# Patient Record
Sex: FEMALE | Race: WHITE | NOT HISPANIC OR LATINO | Employment: FULL TIME | URBAN - METROPOLITAN AREA
[De-identification: names, ages, dates, MRNs, and addresses within clinical notes are randomized per-mention and may not be internally consistent; named-entity substitution may affect disease eponyms.]

---

## 2017-06-27 ENCOUNTER — GENERIC CONVERSION - ENCOUNTER (OUTPATIENT)
Dept: OTHER | Facility: OTHER | Age: 52
End: 2017-06-27

## 2017-12-27 ENCOUNTER — ALLSCRIPTS OFFICE VISIT (OUTPATIENT)
Dept: OTHER | Facility: OTHER | Age: 52
End: 2017-12-27

## 2017-12-28 NOTE — PROGRESS NOTES
Assessment   1  Encounter for gynecological examination () (Z01 419)    Discussion/Summary      Patient informed of a normal GYN exam  A pap smear was not performed due to negative HPV  She will make arrangements for a mammogram in  of this coming year  The patient has the current Goals: To continue a healthy lifestyle  The patent has the current Barriers: No barriers  Patient is able to Self-Care  Chief Complaint   Annual visit      History of Present Illness   HPI: Patient is a 50year old white female,  with 3 prior vaginal deliveries and one C/S  Her method of birth control includes a vasectomy  Her menses are regular and predictable  She is experiencing mood changes and sleep disturbances  Denies hot flashes and vaginal dryness  She denies problems with intimacy  She had normal mammogram in   She denies any new major medical or family illnesses  She is currently seeing a therapist for some anxiety she is having due to issues with her son  GYN , Adult Female [facility] Awkward: The patient is being seen for a gynecology evaluation  The last health maintenance visit was 1 year(s) ago  General Health: The patient's health since the last visit is described as good  She has regular dental visits  -- She denies vision problems  -- She denies hearing loss  Immunizations status: up to date  Lifestyle:  She consumes a diverse and healthy diet  -- She does not have any weight concerns  -- She exercises regularly  -- She does not use tobacco -- She consumes alcohol  She reports occasional alcohol use  She typically drinks wine  Reproductive health: the patient is perimenopausal--   she uses contraception  For contraception, she has a partner with a vasectomy  -- she is sexually active  -- pregnancy history: G 4P 4,-- 4  Screening: cancer screening reviewed and current        Review of Systems        Constitutional: No fever, no chills, feels well, no tiredness, no recent weight gain or loss       ENT: no ear ache, no loss of hearing, no nosebleeds or nasal discharge, no sore throat or hoarseness  Cardiovascular: no complaints of slow or fast heart rate, no chest pain, no palpitations, no leg claudication or lower extremity edema  Respiratory: no complaints of shortness of breath, no wheezing, no dyspnea on exertion, no orthopnea or PND  Breasts: no complaints of breast pain, breast lump or nipple discharge  Gastrointestinal: no complaints of abdominal pain, no constipation, no nausea or diarrhea, no vomiting, no bloody stools  Genitourinary: no complaints of dysuria, no incontinence, no pelvic pain, no dysmenorrhea, no vaginal discharge or abnormal vaginal bleeding  Musculoskeletal: no complaints of arthralgia, no myalgia, no joint swelling or stiffness, no limb pain or swelling  Integumentary: no complaints of skin rash or lesion, no itching or dry skin, no skin wounds  Neurological: no complaints of headache, no confusion, no numbness or tingling, no dizziness or fainting  Over the past 2 weeks, how often have you been bothered by the following problems? 1 ) Little interest or pleasure in doing things? Not at all       2 ) Feeling down, depressed or hopeless? Not at all       3 ) Trouble falling asleep or sleeping too much? Not at all       4 ) Feeling tired or having little energy? Not at all       5 ) Poor appetite or overeating? Not at all       6 ) Feeling bad about yourself, or that you are a failure, or have let yourself or your family down? Not at all       7 ) Trouble concentrating on things, such as reading a newspaper or watching television? Not at all       8 ) Moving or speaking so slowly that other people could have noticed, or the opposite, moving or speaking faster than usual? Not at all  How difficult have these problems made it for you to do your work, take care of things at home, or get along with people? Not at all  Score       OB History   Pregnancy History (Brief):      Prior pregnancies: : 4  Para: 4 (full-term)-- and-- 4 (living)      Delivery type: 3 vaginal,-- 1  section       Active Problems   1  Encounter for gynecological examination (V72 31) (Z01 419)   2  Encounter for screening mammogram for breast cancer (V76 12) (Z12 31)    Surgical History    · History of Appendectomy   · History of  Section     The surgical history was reviewed and updated today  Family History   Mother    · Family history of lung cancer (V16 1) (Z80 1)  Father    · Family history of MVA (motor vehicle accident)  Grandfather    · Family history of heart attack (V17 3) (Z82 49)    Social History    · Never a smoker  The social history was reviewed and updated today  Current Meds    1  No Reported Medications Recorded    Allergies   1  No Known Drug Allergies    Vitals    Recorded: [id number] 83:61AP   Systolic 747   Diastolic 90   Height 5 ft 7 in   Weight 155 lb 6 08 oz   BMI Calculated 24 34   BSA Calculated 1 82   LMP 17-Dec-2017     Physical Exam        Constitutional      General appearance: No acute distress, well appearing and well nourished  Neck      Neck: Normal, supple, trachea midline, no masses  Thyroid: Normal, no thyromegaly  Pulmonary      Respiratory effort: No increased work of breathing or signs of respiratory distress  Auscultation of lungs: Clear to auscultation  Cardiovascular      Auscultation of heart: Normal rate and rhythm, normal S1 and S2, no murmurs  Peripheral vascular exam: Normal pulses Throughout  Genitourinary      External genitalia: Normal and no lesions appreciated  Vagina: Normal, no lesions or dryness appreciated  Urethra: Normal        Urethral meatus: Normal        Bladder: Normal, soft, non-tender and no prolapse or masses appreciated  Cervix: Normal, no palpable masses         Uterus: Normal, non-tender, not enlarged, and no palpable masses  Adnexa/parametria: Normal, non-tender and no fullness or masses appreciated  Chest      Breasts: Normal and no dimpling or skin changes noted  Abdomen      Abdomen: Normal, non-tender, and no organomegaly noted  Liver and spleen: No hepatomegaly or splenomegaly  Examination for hernias: No hernias appreciated  Lymphatic      Palpation of lymph nodes in neck, axillae, groin and/or other locations: No lymphadenopathy or masses noted  Skin      Skin and subcutaneous tissue: Normal skin turgor and no rashes         Palpation of skin and subcutaneous tissue: Normal        Psychiatric      Orientation to person, place, and time: Normal        Mood and affect: Normal        Signatures    Electronically signed by : Bev Bear; Dec 27 2017  3:24PM EST                       (Author)

## 2018-01-22 VITALS
BODY MASS INDEX: 24.39 KG/M2 | HEIGHT: 67 IN | DIASTOLIC BLOOD PRESSURE: 90 MMHG | WEIGHT: 155.38 LBS | SYSTOLIC BLOOD PRESSURE: 134 MMHG

## 2019-07-01 ENCOUNTER — ANNUAL EXAM (OUTPATIENT)
Dept: OBGYN CLINIC | Facility: CLINIC | Age: 54
End: 2019-07-01
Payer: COMMERCIAL

## 2019-07-01 VITALS
BODY MASS INDEX: 24.64 KG/M2 | DIASTOLIC BLOOD PRESSURE: 84 MMHG | WEIGHT: 157 LBS | SYSTOLIC BLOOD PRESSURE: 132 MMHG | HEIGHT: 67 IN

## 2019-07-01 DIAGNOSIS — Z01.419 WOMEN'S ANNUAL ROUTINE GYNECOLOGICAL EXAMINATION: Primary | ICD-10-CM

## 2019-07-01 PROCEDURE — G0145 SCR C/V CYTO,THINLAYER,RESCR: HCPCS | Performed by: OBSTETRICS & GYNECOLOGY

## 2019-07-01 PROCEDURE — 87624 HPV HI-RISK TYP POOLED RSLT: CPT | Performed by: OBSTETRICS & GYNECOLOGY

## 2019-07-01 PROCEDURE — 99396 PREV VISIT EST AGE 40-64: CPT | Performed by: OBSTETRICS & GYNECOLOGY

## 2019-07-01 NOTE — PATIENT INSTRUCTIONS
The patient was informed of a stable britt menopausal gyn examination  A Pap smear was performed  She also has PMS we talked about treatment options including SSRI  Prior to that we have asked her to exercise more, take vitamin B6 daily, and diminish her caffeine in her diet  Colonoscopies are up-to-date  Return to office in 1 year

## 2019-07-01 NOTE — PROGRESS NOTES
HPI    this is a 82-year-old white female, she is a  4 para 4 with 3 vaginal deliveries and 1  section  Her current method of contraception includes vasectomy  Her menstrual cycles are mostly regular  She has noted she has now skipping 2 months over the last year  She also admits to occasional  vaginal dryness  She denies any problem with intimacy  She denies any major  GI complaint  She is not happy with her weight  She does admit to some PMS symptomatology  She has a dentist on a regular basis  She denies depression or anxiety but does admit to some PMS  She denies any new major family illnesses at the present time  She has had a colonoscopy and mammogram     REVIEW OF SYSTEMS   patient admits to some PMS symptomatology  Including mood swings as she approaches menstrual cycles which is relieved after onset of menses    PAST MEDICAL HISTORY  now perimenopause, premenstrual syndrome    PAST SURGICAL HISTORY   appendectomy,  section    FAMILY HISTORY   positive for lung cancer, heart attack,    SOCIAL HISTORY   negative for tobacco positive for social alcohol    PHYSICAL EXAM     this is a well-developed well-nourished white female acute distress  Her HEENT is was within normal limits  Her neck is supple no masses  Her BMI is 24 6  Cardiac exam shows a regular rhythm and rate normal S1-S2  Her lungs are clear auscultation bilaterally  Her abdomen is softer no masses is evidence of  section scar and an appendectomy scar both the will feel  There is no organomegaly  Positive bowel sounds  Pelvic exam the external genitalia normal limits the vagina is clean the cervix is parous but closed uterus is anterior normal size is no cervical motion tenderness  The adnexa clear bilaterally  A Pap smear was performed  There was evidence of recent menses  IMPRESSION  the patient was informed of a stable britt menopausal gyn examination  We had a discussion about PMS    I have asked her to diminish her caffeine, increased vitamin B6 and to exercise more  She may consider an SSRI of symptoms worsen she will keep me informed  She will make arrangements for new mammogram   Her colonoscopies are up-to-date

## 2019-07-02 LAB
HPV HR 12 DNA CVX QL NAA+PROBE: NEGATIVE
HPV16 DNA CVX QL NAA+PROBE: NEGATIVE
HPV18 DNA CVX QL NAA+PROBE: NEGATIVE

## 2019-07-08 LAB
LAB AP GYN PRIMARY INTERPRETATION: NORMAL
LAB AP LMP: NORMAL
Lab: NORMAL

## 2021-03-23 ENCOUNTER — TELEPHONE (OUTPATIENT)
Dept: OBGYN CLINIC | Facility: CLINIC | Age: 56
End: 2021-03-23

## 2021-03-23 NOTE — TELEPHONE ENCOUNTER
Pt having ext vag itching since last week - has used Vagisil  No noticeable discharge  Will try Monistat 3 tx & recall if unimproved  Has yearly appt scheduled for 6/15/2021

## 2021-03-26 DIAGNOSIS — Z23 ENCOUNTER FOR IMMUNIZATION: ICD-10-CM

## 2021-06-15 ENCOUNTER — ANNUAL EXAM (OUTPATIENT)
Dept: OBGYN CLINIC | Facility: CLINIC | Age: 56
End: 2021-06-15
Payer: COMMERCIAL

## 2021-06-15 VITALS
SYSTOLIC BLOOD PRESSURE: 140 MMHG | WEIGHT: 159.6 LBS | HEIGHT: 67 IN | DIASTOLIC BLOOD PRESSURE: 90 MMHG | BODY MASS INDEX: 25.05 KG/M2

## 2021-06-15 DIAGNOSIS — Z12.31 ENCOUNTER FOR SCREENING MAMMOGRAM FOR MALIGNANT NEOPLASM OF BREAST: ICD-10-CM

## 2021-06-15 DIAGNOSIS — Z01.419 WOMEN'S ANNUAL ROUTINE GYNECOLOGICAL EXAMINATION: Primary | ICD-10-CM

## 2021-06-15 PROCEDURE — S0612 ANNUAL GYNECOLOGICAL EXAMINA: HCPCS | Performed by: OBSTETRICS & GYNECOLOGY

## 2021-06-15 NOTE — PATIENT INSTRUCTIONS
The patient was informed of a stable britt menopausal gyn examination  She was encouraged to consider using Estroven and to use vaginal lubrication for intercourse  She is aware of her blood pressure  Colonoscopies and mammograms up-to-date  Return my office in 1 year  She has received COVID vaccine was recovering from COVID infection earlier in this year

## 2021-06-15 NOTE — PROGRESS NOTES
Assessment/Plan:      The patient was informed of a stable britt menopausal gyn examination  A Pap smear was not performed  She is given information about Estroven to on the menopause pause symptoms  She is also encouraged to use lubrication for vaginal dryness with intercourse  She will continue tried exercise lose more weight  She has received a COVID vaccine  She feels safe at home  She will continue to monitor her blood pressure  She should return my office in 1 year  Colonoscopies are up-to-date  Subjective:      Patient ID: Sallie Ponce is a 50 y o  female  HPI    This is a 82-year-old white female, she is a  4 para 4 with 3 vaginal births and 1   Section  She is now perimenopausal   She has had 3 menstrual cycles and this calendar year  She has noticed increasing hot flashes and night sweats panic and occasional vaginal dryness but not interfering with her lifestyle  She is content with her weight would like to lose more  She was positive for COVID earlier in the year  She has also received the COVID vaccine  She denies any major  GI complaint  Denies any problem with depression or anxiety  Colonoscopies are up-to-date  Will make arrange for mammogram   There are no new major family illnesses report at this present time  The following portions of the patient's history were reviewed and updated as appropriate: allergies, current medications, past family history, past medical history, past social history, past surgical history and problem list     Review of Systems   Genitourinary:        Positive for perimenopause symptoms including Kegel vaginal dryness and hot flashes  All other systems reviewed and are negative  Objective:      /90   Ht 5' 7" (1 702 m)   Wt 72 4 kg (159 lb 9 6 oz)   LMP 2021 (Exact Date)   BMI 25 00 kg/m²          Physical Exam  Vitals reviewed  Exam conducted with a chaperone present     Constitutional: Appearance: Normal appearance  She is normal weight  HENT:      Head: Normocephalic and atraumatic  Eyes:      Extraocular Movements: Extraocular movements intact  Cardiovascular:      Rate and Rhythm: Normal rate and regular rhythm  Pulses: Normal pulses  Heart sounds: Normal heart sounds  Pulmonary:      Effort: Pulmonary effort is normal       Breath sounds: Normal breath sounds  Chest:      Breasts: Breasts are symmetrical          Right: Normal  No swelling, bleeding, inverted nipple, mass, nipple discharge or skin change  Left: Normal  No swelling, bleeding, inverted nipple, mass, nipple discharge or skin change  Abdominal:      General: Abdomen is flat  A surgical scar is present  There is no distension  Palpations: Abdomen is soft  There is no mass  Tenderness: There is no abdominal tenderness  There is no guarding or rebound  Hernia: No hernia is present  There is no hernia in the umbilical area, ventral area, left inguinal area or right inguinal area  Comments: The abdomen sh appendectomy scar and the Pfannenstiel's  section scar   Genitourinary:     General: Normal vulva  Pubic Area: No rash or pubic lice  Labia:         Right: No rash, tenderness, lesion or injury  Left: No rash, tenderness, lesion or injury  Urethra: No prolapse, urethral pain, urethral swelling or urethral lesion  Vagina: Normal  No signs of injury and foreign body  No vaginal discharge, erythema, tenderness, bleeding, lesions or prolapsed vaginal walls  Cervix: Normal       Uterus: Normal  Not deviated, not enlarged, not fixed, not tender and no uterine prolapse  Adnexa: Right adnexa normal and left adnexa normal         Right: No mass, tenderness or fullness  Left: No mass, tenderness or fullness  Rectum: Normal       Comments:   The external genitalia normal limits the vagina is clean the cervix is parous but closed uterus is small anterior mobile no cervical motion tenderness  No enlargement the adnexa is clear bilaterally  A Pap smear was not performed  There is no prolapse of the uterus the bladder or the urethra  Musculoskeletal:         General: No swelling, tenderness, deformity or signs of injury  Normal range of motion  Lymphadenopathy:      Lower Body: No right inguinal adenopathy  No left inguinal adenopathy  Skin:     General: Skin is warm and dry  Neurological:      General: No focal deficit present  Mental Status: She is alert and oriented to person, place, and time  Psychiatric:         Mood and Affect: Mood normal          Behavior: Behavior normal          Thought Content:  Thought content normal

## 2021-10-13 ENCOUNTER — TELEPHONE (OUTPATIENT)
Dept: OBGYN CLINIC | Facility: CLINIC | Age: 56
End: 2021-10-13

## 2021-10-13 DIAGNOSIS — N39.0 URINARY TRACT INFECTION WITHOUT HEMATURIA, SITE UNSPECIFIED: Primary | ICD-10-CM

## 2021-10-13 RX ORDER — NITROFURANTOIN 25; 75 MG/1; MG/1
100 CAPSULE ORAL 2 TIMES DAILY
Qty: 14 CAPSULE | Refills: 1 | Status: SHIPPED | OUTPATIENT
Start: 2021-10-13

## 2022-06-14 ENCOUNTER — ANNUAL EXAM (OUTPATIENT)
Dept: OBGYN CLINIC | Facility: CLINIC | Age: 57
End: 2022-06-14
Payer: COMMERCIAL

## 2022-06-14 VITALS
SYSTOLIC BLOOD PRESSURE: 138 MMHG | BODY MASS INDEX: 23.51 KG/M2 | HEIGHT: 67 IN | WEIGHT: 149.8 LBS | DIASTOLIC BLOOD PRESSURE: 80 MMHG

## 2022-06-14 DIAGNOSIS — Z01.419 WOMEN'S ANNUAL ROUTINE GYNECOLOGICAL EXAMINATION: Primary | ICD-10-CM

## 2022-06-14 DIAGNOSIS — Z12.31 ENCOUNTER FOR SCREENING MAMMOGRAM FOR MALIGNANT NEOPLASM OF BREAST: ICD-10-CM

## 2022-06-14 PROCEDURE — G0476 HPV COMBO ASSAY CA SCREEN: HCPCS | Performed by: OBSTETRICS & GYNECOLOGY

## 2022-06-14 PROCEDURE — G0145 SCR C/V CYTO,THINLAYER,RESCR: HCPCS | Performed by: OBSTETRICS & GYNECOLOGY

## 2022-06-14 PROCEDURE — S0612 ANNUAL GYNECOLOGICAL EXAMINA: HCPCS | Performed by: OBSTETRICS & GYNECOLOGY

## 2022-06-14 NOTE — PATIENT INSTRUCTIONS
The patient was informed of a stable gyn examination  A Pap smear was performed  She will continue getting her mammograms on a yearly basis  She is content with her weight  She should return my office in 1 year unless new issues occur  She will look for signs symptoms of menopause

## 2022-06-14 NOTE — PROGRESS NOTES
Assessment/Plan:    The patient was informed of a stable britt menopausal gyn examination  She will continue to look for signs symptoms of true menopause  If hot flashes vaginal dryness or mood swings, problem she will contact me  She will continue get yearly mammograms  She will continue get her colonoscopies as directed  She is content with her weight  She will still see dentist on a regular basis  She should return my office in 1 year  A Pap smear was performed today  Subjective:      Patient ID: Len Granados is a 49 y o  female  HPI  This is a 53-year-old white female, she is a  4 para 4 with 3 vaginal deliveries and 1  section  She is now britt menopausal   She has had 2 menstrual cycles this calendar year  This is associated with some hot flashes and some night sweats and vaginal dryness which is smile  Denies any major  GI complaint  Colonoscopies are up-to-date  She is content with her weight  She feels safe at home  She has received COVID vaccine  There are no new major family illnesses report  She has lost 10 lb since her last visit  She has a dentist on a regular basis  The following portions of the patient's history were reviewed and updated as appropriate: allergies, current medications, past family history, past medical history, past social history, past surgical history and problem list     Review of Systems   All other systems reviewed and are negative  Objective:      /80   Ht 5' 7" (1 702 m)   Wt 67 9 kg (149 lb 12 8 oz)   LMP 2022 (Exact Date)   BMI 23 46 kg/m²          Physical Exam  Vitals reviewed  Exam conducted with a chaperone present  Constitutional:       Appearance: Normal appearance  She is normal weight  HENT:      Head: Normocephalic  Mouth/Throat:      Mouth: Mucous membranes are moist    Eyes:      Extraocular Movements: Extraocular movements intact        Pupils: Pupils are equal, round, and reactive to light  Cardiovascular:      Rate and Rhythm: Normal rate and regular rhythm  Pulses: Normal pulses  Heart sounds: Normal heart sounds  Pulmonary:      Effort: Pulmonary effort is normal       Breath sounds: Normal breath sounds  Chest:   Breasts:      Right: Normal  No swelling, bleeding, inverted nipple, mass, nipple discharge, skin change or tenderness  Left: Normal  No swelling, bleeding, inverted nipple, mass, nipple discharge, skin change or tenderness  Abdominal:      General: Abdomen is flat  A surgical scar is present  Bowel sounds are normal  There is no distension  Palpations: Abdomen is soft  There is no mass  Tenderness: There is no abdominal tenderness  There is no guarding or rebound  Hernia: No hernia is present  There is no hernia in the left inguinal area or right inguinal area  Comments: Well-healed  section scar x1   Genitourinary:     General: Normal vulva  Pubic Area: No rash or pubic lice  Labia:         Right: No rash, tenderness or injury  Left: No rash, tenderness, lesion or injury  Urethra: No prolapse, urethral pain, urethral swelling or urethral lesion  Vagina: Normal  No signs of injury and foreign body  No vaginal discharge, erythema, tenderness, bleeding, lesions or prolapsed vaginal walls  Cervix: Normal       Uterus: Normal  Not deviated, not enlarged, not fixed, not tender and no uterine prolapse  Adnexa: Right adnexa normal and left adnexa normal         Right: No mass, tenderness or fullness  Left: No mass, tenderness or fullness  Rectum: Normal       Comments: The external genitalia normal limits the vagina is clean the cervix parous but closed uterus is midposition normal size there is no cervical motion tenderness  The adnexa clear bilaterally  There is no evidence of prolapse  Urethra is normal appearance  A Pap smear was performed    Musculoskeletal: General: No swelling, tenderness, deformity or signs of injury  Cervical back: Normal range of motion and neck supple  Skin:     General: Skin is warm  Neurological:      General: No focal deficit present  Mental Status: She is alert and oriented to person, place, and time  Psychiatric:         Mood and Affect: Mood normal          Behavior: Behavior normal          Thought Content:  Thought content normal

## 2022-06-21 LAB
LAB AP GYN PRIMARY INTERPRETATION: NORMAL
LAB AP LMP: NORMAL
Lab: NORMAL

## 2022-06-27 DIAGNOSIS — Z12.31 ENCOUNTER FOR SCREENING MAMMOGRAM FOR MALIGNANT NEOPLASM OF BREAST: ICD-10-CM

## 2023-07-18 ENCOUNTER — ANNUAL EXAM (OUTPATIENT)
Dept: OBGYN CLINIC | Facility: CLINIC | Age: 58
End: 2023-07-18
Payer: COMMERCIAL

## 2023-07-18 VITALS
BODY MASS INDEX: 23.39 KG/M2 | HEIGHT: 67 IN | WEIGHT: 149 LBS | DIASTOLIC BLOOD PRESSURE: 80 MMHG | SYSTOLIC BLOOD PRESSURE: 118 MMHG

## 2023-07-18 DIAGNOSIS — Z12.31 ENCOUNTER FOR SCREENING MAMMOGRAM FOR BREAST CANCER: ICD-10-CM

## 2023-07-18 DIAGNOSIS — Z01.419 WOMEN'S ANNUAL ROUTINE GYNECOLOGICAL EXAMINATION: Primary | ICD-10-CM

## 2023-07-18 PROCEDURE — S0612 ANNUAL GYNECOLOGICAL EXAMINA: HCPCS | Performed by: OBSTETRICS & GYNECOLOGY

## 2023-07-18 NOTE — PATIENT INSTRUCTIONS
The patient was informed of a stable menopausal GYN examination. A Pap smear was not performed. She is content with her weight. She colonoscopies and mammograms up-to-date. She should return to my office in 1 year. She will try to get her  to see a urologist for his issues.

## 2023-07-18 NOTE — PROGRESS NOTES
Assessment/Plan:    The patient was informed of a stable menopausal GYN examination. She will use over-the-counter Estroven for her perimenopausal symptoms. If this not effective we may consider using Activella. She is content with her weight. She feels safe at home. She should return to my office in 1 year. She is continue getting yearly mammograms. Her colonoscopies are up-to-date. Subjective:      Patient ID: Bijan Shields is a 56 y.o. female. HPI    This is a 53-year-old white female, she is a first . She is a  4 para 4 with 3 vaginal deliveries and 1  section. She is now menopausal.  Menopause symptoms are under control. We did talk about using over-the-counter Estroven to limit some minor symptoms. She denies any major  or GI complaint. Currently she does not take any prescription medication. Colonoscopies and mammograms up-to-date. She feels safe at home. She sees a dentist on a regular basis. She is is concerned about the lack of intimacy secondary to her 's issues. He may have erectile dysfunction. He is reluctant to seek medical attention. She is content with her weight. There are no new major family illnesses to report. The following portions of the patient's history were reviewed and updated as appropriate: allergies, current medications, past family history, past medical history, past social history, past surgical history and problem list.    Review of Systems   Genitourinary:        Perimenopausal symptoms   All other systems reviewed and are negative. Objective:      /80   Ht 5' 7" (1.702 m)   Wt 67.6 kg (149 lb)   LMP 2022 (Exact Date)   BMI 23.34 kg/m²          Physical Exam  Vitals reviewed. Exam conducted with a chaperone present. Constitutional:       Appearance: Normal appearance. She is normal weight. HENT:      Head: Normocephalic and atraumatic.       Mouth/Throat:      Mouth: Mucous membranes are moist.   Eyes:      Pupils: Pupils are equal, round, and reactive to light. Cardiovascular:      Rate and Rhythm: Normal rate and regular rhythm. Pulses: Normal pulses. Heart sounds: Normal heart sounds. Pulmonary:      Effort: Pulmonary effort is normal.      Breath sounds: Normal breath sounds. Chest:      Chest wall: No mass, lacerations, deformity, swelling, tenderness, crepitus or edema. There is no dullness to percussion. Breasts:     Breasts are symmetrical.      Right: Normal.      Left: Normal.   Abdominal:      General: Abdomen is flat. A surgical scar is present. Bowel sounds are normal. There is no distension. Palpations: There is no hepatomegaly, splenomegaly or mass. Tenderness: There is no abdominal tenderness. Hernia: No hernia is present. There is no hernia in the left inguinal area or right inguinal area. Comments:  section scar well-healed x1   Genitourinary:     General: Normal vulva. Pubic Area: No rash or pubic lice. Labia:         Right: No rash, tenderness, lesion or injury. Left: No rash, tenderness, lesion or injury. Urethra: No prolapse, urethral pain, urethral swelling or urethral lesion. Vagina: Normal.      Cervix: Normal.      Uterus: Normal.       Adnexa: Right adnexa normal and left adnexa normal.      Rectum: Normal.      Comments: The external genitalia normal limits the vagina is clean the cervix is parous but closed uterus is anterior normal size the adnexa clear bilaterally. There is no evidence of prolapse. Pap smear was not performed. There is no cervical motion tenderness. Musculoskeletal:         General: Normal range of motion. Cervical back: Normal range of motion and neck supple. Lymphadenopathy:      Upper Body:      Right upper body: No supraclavicular adenopathy. Lower Body: No right inguinal adenopathy. No left inguinal adenopathy.    Skin:     General: Skin is warm. Neurological:      General: No focal deficit present. Mental Status: She is alert and oriented to person, place, and time. Psychiatric:         Mood and Affect: Mood normal.         Behavior: Behavior normal.         Thought Content:  Thought content normal.

## 2023-09-27 ENCOUNTER — TELEPHONE (OUTPATIENT)
Dept: OBGYN CLINIC | Facility: CLINIC | Age: 58
End: 2023-09-27

## 2023-09-27 DIAGNOSIS — Z79.890 HORMONE REPLACEMENT THERAPY: Primary | ICD-10-CM

## 2023-09-27 RX ORDER — ESTRADIOL AND NORETHINDRONE ACETATE 1; .5 MG/1; MG/1
1 TABLET ORAL DAILY
Qty: 60 TABLET | Refills: 1 | Status: SHIPPED | OUTPATIENT
Start: 2023-09-27

## 2023-09-27 NOTE — TELEPHONE ENCOUNTER
The patient is now strongly interested in using hormone replacement therapy we will start her on Activella she has no contraindications. She will watch for bleeding spotting or cramping. She return to my office in 4 weeks for reevaluation.

## 2023-09-27 NOTE — TELEPHONE ENCOUNTER
Patient states she talk to you about hormone replacement and she also talk to her PCP, and she was recommended to speak to you about this.  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

## 2024-01-29 DIAGNOSIS — Z79.890 HORMONE REPLACEMENT THERAPY: ICD-10-CM

## 2024-01-29 RX ORDER — ESTRADIOL AND NORETHINDRONE ACETATE 1; .5 MG/1; MG/1
1 TABLET, FILM COATED ORAL DAILY
Qty: 28 TABLET | Refills: 3 | Status: SHIPPED | OUTPATIENT
Start: 2024-01-29

## 2024-06-25 DIAGNOSIS — Z79.890 HORMONE REPLACEMENT THERAPY: ICD-10-CM

## 2024-06-25 RX ORDER — ESTRADIOL AND NORETHINDRONE ACETATE 1; .5 MG/1; MG/1
1 TABLET ORAL DAILY
Qty: 28 TABLET | Refills: 0 | Status: SHIPPED | OUTPATIENT
Start: 2024-06-25

## 2024-07-18 DIAGNOSIS — Z79.890 HORMONE REPLACEMENT THERAPY: ICD-10-CM

## 2024-07-23 ENCOUNTER — ANNUAL EXAM (OUTPATIENT)
Dept: OBGYN CLINIC | Facility: CLINIC | Age: 59
End: 2024-07-23
Payer: COMMERCIAL

## 2024-07-23 VITALS — WEIGHT: 153 LBS | DIASTOLIC BLOOD PRESSURE: 86 MMHG | BODY MASS INDEX: 23.96 KG/M2 | SYSTOLIC BLOOD PRESSURE: 122 MMHG

## 2024-07-23 DIAGNOSIS — F41.9 ANXIETY: Primary | ICD-10-CM

## 2024-07-23 DIAGNOSIS — Z79.890 HORMONE REPLACEMENT THERAPY: ICD-10-CM

## 2024-07-23 DIAGNOSIS — Z01.419 WOMEN'S ANNUAL ROUTINE GYNECOLOGICAL EXAMINATION: ICD-10-CM

## 2024-07-23 PROCEDURE — S0612 ANNUAL GYNECOLOGICAL EXAMINA: HCPCS | Performed by: OBSTETRICS & GYNECOLOGY

## 2024-07-23 RX ORDER — ESTRADIOL AND NORETHINDRONE ACETATE 1; .5 MG/1; MG/1
1 TABLET, FILM COATED ORAL DAILY
Qty: 84 TABLET | Refills: 1 | Status: SHIPPED | OUTPATIENT
Start: 2024-07-23 | End: 2024-07-23 | Stop reason: SDUPTHER

## 2024-07-23 RX ORDER — SERTRALINE HYDROCHLORIDE 25 MG/1
25 TABLET, FILM COATED ORAL DAILY
Qty: 30 TABLET | Refills: 3 | Status: SHIPPED | OUTPATIENT
Start: 2024-07-23

## 2024-07-23 RX ORDER — ESTRADIOL AND NORETHINDRONE ACETATE 1; .5 MG/1; MG/1
1 TABLET ORAL DAILY
Qty: 84 TABLET | Refills: 1 | Status: SHIPPED | OUTPATIENT
Start: 2024-07-23

## 2024-07-23 NOTE — PATIENT INSTRUCTIONS
Patient was informed of a stable GYN examination.  A Pap smear was not performed.  She will continue getting yearly mammograms.  Because of her anxiety we decided to start her on a course of Zoloft 25 mg daily for 4 weeks.  She will return my office in 4 weeks for reevaluation.  We will continue the HRT for a month.  She will continue getting yearly mammograms.

## 2024-07-23 NOTE — PROGRESS NOTES
Ambulatory Visit  Name: Abigail Esparza      : 1965      MRN: [id number]  Encounter Provider: Ga Tavera MD  Encounter Date: 2024   Encounter department: OB GYN [facility]    Assessment & Plan   1. Women's annual routine gynecological examination  The patient was informed of a stable menopausal GYN examination.  She will need colonoscopy in 3 years.  She will continue getting yearly mammograms.  Because of her anxiety we decided to treat her with a 4-week course of Zoloft 25 mg.  Return to my office in 4 weeks for reevaluation.  This will be a virtual visit.  Her colonoscopy is due in 3 years.  She had a mammogram that she has was normal.  She is content with her weight.  She feels safe at home.  There is an issue of erectile dysfunction.  The patient will attend more intimacy with her .  He has been evaluated by his physician.    History of Present Illness     Abigail Esparza is a 59 y.o. female who presents for her annual GYN examination.  She is a  4 para 4.  She is menopausal.  She has been on Activella HRT.  She is not sure if it is really working for her.  Also admits to some increased anxiety.  She is contemplating being on an SSRI.  I think it would help her.  She is content with her weight.  She feels safe at home.  She is a dentist on a regular basis.  She denies depression but does admit to anxiety.  She began colorectal screening at age 52 is not needed for another 3 years.  There are no new major family illnesses to report.  She does not need a Pap smear today.  She will continue getting yearly mammograms.    Review of Systems   Psychiatric/Behavioral:          Anxiety       Objective     /86   Wt 69.4 kg (153 lb)   LMP 2022 (Exact Date)   BMI 23.96 kg/m²     Physical Exam  Vitals reviewed. Exam conducted with a chaperone present.   Constitutional:       Appearance: Normal appearance. She is normal weight.   HENT:      Head: Normocephalic and  atraumatic.      Mouth/Throat:      Mouth: Mucous membranes are moist.   Eyes:      Pupils: Pupils are equal, round, and reactive to light.   Cardiovascular:      Rate and Rhythm: Normal rate and regular rhythm.   Pulmonary:      Effort: Pulmonary effort is normal.      Breath sounds: Normal breath sounds.   Chest:   Breasts:     Breasts are symmetrical.      Right: Normal. No swelling, bleeding, inverted nipple or mass.      Left: Normal. No swelling, bleeding, inverted nipple or mass.   Abdominal:      General: Abdomen is flat. A surgical scar is present. Bowel sounds are normal. There is no distension.      Palpations: Abdomen is soft. There is no hepatomegaly, splenomegaly or mass.      Tenderness: There is no abdominal tenderness. There is no guarding or rebound.      Hernia: No hernia is present. There is no hernia in the left inguinal area or right inguinal area.          Comments:  section scar well-healed x 1     Genitourinary:     General: Normal vulva.      Pubic Area: No rash or pubic lice.       Labia:         Right: No rash, tenderness, lesion or injury.         Left: No rash, tenderness, lesion or injury.       Urethra: No prolapse or urethral pain.      Vagina: Normal. No signs of injury and foreign body. No vaginal discharge, erythema, tenderness or bleeding.      Cervix: Normal.      Uterus: Normal.       Adnexa: Right adnexa normal and left adnexa normal.        Right: No mass, tenderness or fullness.          Left: No mass, tenderness or fullness.        Rectum: Normal.      Comments: The external genitalia normal limits the vagina is clean the uterus is small anterior the adnexa clear bilaterally.  There is no evidence of prolapse.  Pap smear was not performed.  Urethra and bladder are normal working relationship.  Musculoskeletal:         General: Normal range of motion.      Cervical back: Normal range of motion and neck supple.   Lymphadenopathy:      Lower Body: No right inguinal  adenopathy. No left inguinal adenopathy.   Skin:     General: Skin is warm and dry.   Neurological:      General: No focal deficit present.      Mental Status: She is alert and oriented to person, place, and time.   Psychiatric:         Mood and Affect: Mood normal.         Behavior: Behavior normal.       Administrative Statements

## 2024-08-15 DIAGNOSIS — F41.9 ANXIETY: ICD-10-CM

## 2024-08-15 RX ORDER — SERTRALINE HYDROCHLORIDE 25 MG/1
25 TABLET, FILM COATED ORAL DAILY
Qty: 90 TABLET | Refills: 1 | Status: SHIPPED | OUTPATIENT
Start: 2024-08-15

## 2024-08-22 ENCOUNTER — TELEMEDICINE (OUTPATIENT)
Dept: OBGYN CLINIC | Facility: CLINIC | Age: 59
End: 2024-08-22
Payer: COMMERCIAL

## 2024-08-22 DIAGNOSIS — F41.9 ANXIETY: Primary | ICD-10-CM

## 2024-08-22 PROCEDURE — 99213 OFFICE O/P EST LOW 20 MIN: CPT | Performed by: OBSTETRICS & GYNECOLOGY

## 2024-08-22 NOTE — PATIENT INSTRUCTIONS
Patient states she is seen in some improvement in her anxiety being on Zoloft.  There is no major side effects.  This is not affecting her sleep.  No affecting her libido.  Will have her continue this.  She is to call my office in 3 months for follow-up.  If things change or get worse she will call me as soon as possible.

## 2024-08-22 NOTE — PROGRESS NOTES
Virtual Regular Visit  Name: Abigail Esparza      : 1965      MRN: [id number]  Encounter Provider: Ga Tavera MD  Encounter Date: 2024   Encounter department: OB GYN [facility]    Verification of patient location:    Patient is located at Home in the following state in which I hold an active license [place]    Assessment & Plan   1. Anxiety    The patient states her anxiety is improved being on Zoloft.  He will stay on the current dosage.  She will call me in 3 months with a progress report.  Any change or issues she will contact me as soon as possible.    Encounter provider Ga Tavera MD    The patient was identified by name and date of birth. Abigail Esparza was informed that this is a telemedicine visit and that the visit is being conducted through the [employer] platform. She agrees to proceed..  My office door was closed. No one else was in the room.  She acknowledged consent and understanding of privacy and security of the video platform. The patient has agreed to participate and understands they can discontinue the visit at any time.    Patient is aware this is a billable service.     History of Present Illness     Abigail Esparza is a 59 y.o. female who presents for follow-up visit after being treated for anxiety with Zoloft.  We started approximate month ago.  She states that she is doing much better.  The anxiety is under control.  Her  can see an improvement.  She states that she is able to work better.  There is no noticeable side effects.  She would like to continue.  Is no affecting her sleeping.  There is no change in her libido.    Review of Systems    Objective     LMP 2022 (Exact Date)   Physical Exam  The patient is alert and oriented.  Her speech is stable.    Visit Time  Total Visit Duration: 4 minutes

## 2025-01-05 DIAGNOSIS — Z79.890 HORMONE REPLACEMENT THERAPY: ICD-10-CM

## 2025-01-07 RX ORDER — ESTRADIOL AND NORETHINDRONE ACETATE 1; .5 MG/1; MG/1
1 TABLET, FILM COATED ORAL DAILY
Qty: 28 TABLET | Refills: 5 | Status: SHIPPED | OUTPATIENT
Start: 2025-01-07

## 2025-02-07 DIAGNOSIS — F41.9 ANXIETY: ICD-10-CM

## 2025-02-07 RX ORDER — SERTRALINE HYDROCHLORIDE 25 MG/1
25 TABLET, FILM COATED ORAL DAILY
Qty: 30 TABLET | Refills: 5 | Status: SHIPPED | OUTPATIENT
Start: 2025-02-07

## 2025-02-08 DIAGNOSIS — F41.9 ANXIETY: ICD-10-CM

## 2025-02-10 RX ORDER — SERTRALINE HYDROCHLORIDE 25 MG/1
25 TABLET, FILM COATED ORAL DAILY
Qty: 30 TABLET | Refills: 0 | OUTPATIENT
Start: 2025-02-10

## 2025-06-24 DIAGNOSIS — Z79.890 HORMONE REPLACEMENT THERAPY: ICD-10-CM

## 2025-06-24 RX ORDER — ESTRADIOL AND NORETHINDRONE ACETATE 1; .5 MG/1; MG/1
1 TABLET, FILM COATED ORAL DAILY
Qty: 56 TABLET | Refills: 0 | Status: SHIPPED | OUTPATIENT
Start: 2025-06-24

## 2025-07-16 DIAGNOSIS — Z00.6 ENCOUNTER FOR EXAMINATION FOR NORMAL COMPARISON OR CONTROL IN CLINICAL RESEARCH PROGRAM: ICD-10-CM

## 2025-07-17 DIAGNOSIS — Z79.890 HORMONE REPLACEMENT THERAPY: ICD-10-CM

## 2025-07-17 RX ORDER — ESTRADIOL AND NORETHINDRONE ACETATE 1; .5 MG/1; MG/1
1 TABLET, FILM COATED ORAL DAILY
Qty: 84 TABLET | Refills: 1 | Status: SHIPPED | OUTPATIENT
Start: 2025-07-17

## 2025-07-22 ENCOUNTER — ANNUAL EXAM (OUTPATIENT)
Dept: OBGYN CLINIC | Facility: CLINIC | Age: 60
End: 2025-07-22
Payer: COMMERCIAL

## 2025-07-22 VITALS — DIASTOLIC BLOOD PRESSURE: 84 MMHG | WEIGHT: 153 LBS | SYSTOLIC BLOOD PRESSURE: 118 MMHG | BODY MASS INDEX: 23.96 KG/M2

## 2025-07-22 DIAGNOSIS — Z01.419 WOMEN'S ANNUAL ROUTINE GYNECOLOGICAL EXAMINATION: Primary | ICD-10-CM

## 2025-07-22 DIAGNOSIS — Z12.31 ENCOUNTER FOR SCREENING MAMMOGRAM FOR BREAST CANCER: ICD-10-CM

## 2025-07-22 PROCEDURE — S0612 ANNUAL GYNECOLOGICAL EXAMINA: HCPCS | Performed by: OBSTETRICS & GYNECOLOGY

## 2025-07-22 PROCEDURE — G0476 HPV COMBO ASSAY CA SCREEN: HCPCS | Performed by: OBSTETRICS & GYNECOLOGY

## 2025-07-22 PROCEDURE — G0145 SCR C/V CYTO,THINLAYER,RESCR: HCPCS | Performed by: OBSTETRICS & GYNECOLOGY

## 2025-07-22 NOTE — PATIENT INSTRUCTIONS
The patient was informed of a stable menopausal GYN examination.  She will be stopping the Activella soon.  She will keep me informed of her progress.  She is going to work on her weight.  Her colonoscopy is up-to-date.  She should return to my office in 1 year unless new issues or problems occur.  If she stops for the hormone placement therapy and then decides to replace it we will do the Vivelle-Dot and Prometrium.  She let me know of her decision.

## 2025-07-23 ENCOUNTER — APPOINTMENT (OUTPATIENT)
Dept: LAB | Age: 60
End: 2025-07-23

## 2025-07-23 DIAGNOSIS — Z00.6 ENCOUNTER FOR EXAMINATION FOR NORMAL COMPARISON OR CONTROL IN CLINICAL RESEARCH PROGRAM: ICD-10-CM

## 2025-07-23 PROCEDURE — 36415 COLL VENOUS BLD VENIPUNCTURE: CPT

## 2025-07-28 LAB
LAB AP GYN PRIMARY INTERPRETATION: NORMAL
Lab: NORMAL

## 2025-08-03 LAB
APOB+LDLR+PCSK9 GENE MUT ANL BLD/T: NOT DETECTED
BRCA1+BRCA2 DEL+DUP + FULL MUT ANL BLD/T: NOT DETECTED
MLH1+MSH2+MSH6+PMS2 GN DEL+DUP+FUL M: NOT DETECTED